# Patient Record
Sex: MALE | Race: WHITE | NOT HISPANIC OR LATINO | URBAN - METROPOLITAN AREA
[De-identification: names, ages, dates, MRNs, and addresses within clinical notes are randomized per-mention and may not be internally consistent; named-entity substitution may affect disease eponyms.]

---

## 2018-06-25 PROBLEM — Z00.00 ENCOUNTER FOR PREVENTIVE HEALTH EXAMINATION: Status: ACTIVE | Noted: 2018-06-25

## 2018-06-28 ENCOUNTER — OUTPATIENT (OUTPATIENT)
Dept: OUTPATIENT SERVICES | Facility: HOSPITAL | Age: 83
LOS: 1 days | End: 2018-06-28
Payer: MEDICARE

## 2018-06-28 DIAGNOSIS — M54.16 RADICULOPATHY, LUMBAR REGION: ICD-10-CM

## 2018-06-28 PROCEDURE — 62323 NJX INTERLAMINAR LMBR/SAC: CPT

## 2018-06-28 PROCEDURE — 77003 FLUOROGUIDE FOR SPINE INJECT: CPT

## 2018-07-20 ENCOUNTER — OUTPATIENT (OUTPATIENT)
Dept: OUTPATIENT SERVICES | Facility: HOSPITAL | Age: 83
LOS: 1 days | End: 2018-07-20
Payer: MEDICARE

## 2018-07-20 DIAGNOSIS — M54.16 RADICULOPATHY, LUMBAR REGION: ICD-10-CM

## 2018-07-20 PROCEDURE — 77003 FLUOROGUIDE FOR SPINE INJECT: CPT

## 2018-07-20 PROCEDURE — 62323 NJX INTERLAMINAR LMBR/SAC: CPT

## 2019-08-15 ENCOUNTER — OUTPATIENT (OUTPATIENT)
Dept: OUTPATIENT SERVICES | Facility: HOSPITAL | Age: 84
LOS: 1 days | End: 2019-08-15
Payer: MEDICARE

## 2019-08-15 ENCOUNTER — APPOINTMENT (OUTPATIENT)
Dept: RADIOLOGY | Facility: HOSPITAL | Age: 84
End: 2019-08-15

## 2019-08-15 DIAGNOSIS — M48.07 SPINAL STENOSIS, LUMBOSACRAL REGION: ICD-10-CM

## 2019-08-15 PROCEDURE — 62304 MYELOGRAPHY LUMBAR INJECTION: CPT

## 2019-08-15 PROCEDURE — 72132 CT LUMBAR SPINE W/DYE: CPT | Mod: 26

## 2019-08-15 PROCEDURE — 72132 CT LUMBAR SPINE W/DYE: CPT

## 2019-10-22 ENCOUNTER — OUTPATIENT (OUTPATIENT)
Dept: OUTPATIENT SERVICES | Facility: HOSPITAL | Age: 84
LOS: 1 days | End: 2019-10-22
Payer: MEDICARE

## 2019-10-22 DIAGNOSIS — M54.16 RADICULOPATHY, LUMBAR REGION: ICD-10-CM

## 2019-10-22 PROCEDURE — 62323 NJX INTERLAMINAR LMBR/SAC: CPT

## 2019-10-22 PROCEDURE — 77003 FLUOROGUIDE FOR SPINE INJECT: CPT

## 2020-01-16 ENCOUNTER — OUTPATIENT (OUTPATIENT)
Dept: OUTPATIENT SERVICES | Facility: HOSPITAL | Age: 85
LOS: 1 days | Discharge: HOME | End: 2020-01-16
Payer: MEDICARE

## 2020-01-16 DIAGNOSIS — M54.16 RADICULOPATHY, LUMBAR REGION: ICD-10-CM

## 2020-01-16 PROCEDURE — 77003 FLUOROGUIDE FOR SPINE INJECT: CPT

## 2020-01-16 PROCEDURE — 62323 NJX INTERLAMINAR LMBR/SAC: CPT

## 2020-07-14 ENCOUNTER — OUTPATIENT (OUTPATIENT)
Dept: OUTPATIENT SERVICES | Facility: HOSPITAL | Age: 85
LOS: 1 days | End: 2020-07-14
Payer: MEDICARE

## 2020-07-14 DIAGNOSIS — Z11.59 ENCOUNTER FOR SCREENING FOR OTHER VIRAL DISEASES: ICD-10-CM

## 2020-07-14 PROCEDURE — U0003: CPT

## 2020-07-15 LAB — SARS-COV-2 RNA SPEC QL NAA+PROBE: SIGNIFICANT CHANGE UP

## 2020-07-16 ENCOUNTER — OUTPATIENT (OUTPATIENT)
Dept: OUTPATIENT SERVICES | Facility: HOSPITAL | Age: 85
LOS: 1 days | End: 2020-07-16
Payer: MEDICARE

## 2020-07-16 DIAGNOSIS — M54.16 RADICULOPATHY, LUMBAR REGION: ICD-10-CM

## 2020-07-16 PROCEDURE — 77003 FLUOROGUIDE FOR SPINE INJECT: CPT

## 2020-07-16 PROCEDURE — 62323 NJX INTERLAMINAR LMBR/SAC: CPT

## 2021-02-04 ENCOUNTER — TRANSCRIPTION ENCOUNTER (OUTPATIENT)
Age: 86
End: 2021-02-04

## 2021-03-15 ENCOUNTER — TRANSCRIPTION ENCOUNTER (OUTPATIENT)
Age: 86
End: 2021-03-15

## 2021-04-25 ENCOUNTER — TRANSCRIPTION ENCOUNTER (OUTPATIENT)
Age: 86
End: 2021-04-25

## 2021-09-05 ENCOUNTER — TRANSCRIPTION ENCOUNTER (OUTPATIENT)
Age: 86
End: 2021-09-05

## 2022-01-02 ENCOUNTER — INPATIENT (INPATIENT)
Facility: HOSPITAL | Age: 87
LOS: 1 days | Discharge: HOME | DRG: 178 | End: 2022-01-04
Attending: INTERNAL MEDICINE | Admitting: STUDENT IN AN ORGANIZED HEALTH CARE EDUCATION/TRAINING PROGRAM
Payer: MEDICARE

## 2022-01-02 VITALS
DIASTOLIC BLOOD PRESSURE: 69 MMHG | RESPIRATION RATE: 22 BRPM | WEIGHT: 160.06 LBS | OXYGEN SATURATION: 96 % | HEART RATE: 70 BPM | SYSTOLIC BLOOD PRESSURE: 120 MMHG | TEMPERATURE: 98 F | HEIGHT: 62 IN

## 2022-01-02 DIAGNOSIS — Z29.9 ENCOUNTER FOR PROPHYLACTIC MEASURES, UNSPECIFIED: ICD-10-CM

## 2022-01-02 DIAGNOSIS — J18.9 PNEUMONIA, UNSPECIFIED ORGANISM: ICD-10-CM

## 2022-01-02 DIAGNOSIS — Z98.890 OTHER SPECIFIED POSTPROCEDURAL STATES: Chronic | ICD-10-CM

## 2022-01-02 DIAGNOSIS — D64.9 ANEMIA, UNSPECIFIED: ICD-10-CM

## 2022-01-02 DIAGNOSIS — J44.9 CHRONIC OBSTRUCTIVE PULMONARY DISEASE, UNSPECIFIED: ICD-10-CM

## 2022-01-02 DIAGNOSIS — U07.1 COVID-19: ICD-10-CM

## 2022-01-02 DIAGNOSIS — J44.1 CHRONIC OBSTRUCTIVE PULMONARY DISEASE WITH (ACUTE) EXACERBATION: ICD-10-CM

## 2022-01-02 DIAGNOSIS — I50.9 HEART FAILURE, UNSPECIFIED: ICD-10-CM

## 2022-01-02 DIAGNOSIS — I48.91 UNSPECIFIED ATRIAL FIBRILLATION: ICD-10-CM

## 2022-01-02 LAB
ALBUMIN SERPL ELPH-MCNC: 2.4 G/DL — LOW (ref 3.3–5)
ALP SERPL-CCNC: 88 U/L — SIGNIFICANT CHANGE UP (ref 40–120)
ALT FLD-CCNC: 41 U/L — SIGNIFICANT CHANGE UP (ref 12–78)
ANION GAP SERPL CALC-SCNC: 6 MMOL/L — SIGNIFICANT CHANGE UP (ref 5–17)
APPEARANCE UR: ABNORMAL
APTT BLD: 26.9 SEC — LOW (ref 27.5–35.5)
AST SERPL-CCNC: 20 U/L — SIGNIFICANT CHANGE UP (ref 15–37)
BACTERIA # UR AUTO: ABNORMAL
BASE EXCESS BLDA CALC-SCNC: 3.3 MMOL/L — HIGH (ref -2–3)
BASOPHILS # BLD AUTO: 0.01 K/UL — SIGNIFICANT CHANGE UP (ref 0–0.2)
BASOPHILS NFR BLD AUTO: 0.1 % — SIGNIFICANT CHANGE UP (ref 0–2)
BILIRUB SERPL-MCNC: 0.3 MG/DL — SIGNIFICANT CHANGE UP (ref 0.2–1.2)
BILIRUB UR-MCNC: NEGATIVE — SIGNIFICANT CHANGE UP
BLOOD GAS COMMENTS ARTERIAL: SIGNIFICANT CHANGE UP
BLOOD GAS COMMENTS ARTERIAL: SIGNIFICANT CHANGE UP
BUN SERPL-MCNC: 29 MG/DL — HIGH (ref 7–23)
CALCIUM SERPL-MCNC: 9.3 MG/DL — SIGNIFICANT CHANGE UP (ref 8.5–10.1)
CHLORIDE SERPL-SCNC: 108 MMOL/L — SIGNIFICANT CHANGE UP (ref 96–108)
CK SERPL-CCNC: 16 U/L — LOW (ref 26–308)
CO2 SERPL-SCNC: 27 MMOL/L — SIGNIFICANT CHANGE UP (ref 22–31)
COLOR SPEC: YELLOW — SIGNIFICANT CHANGE UP
CREAT SERPL-MCNC: 0.85 MG/DL — SIGNIFICANT CHANGE UP (ref 0.5–1.3)
DIFF PNL FLD: NEGATIVE — SIGNIFICANT CHANGE UP
EOSINOPHIL # BLD AUTO: 0.01 K/UL — SIGNIFICANT CHANGE UP (ref 0–0.5)
EOSINOPHIL NFR BLD AUTO: 0.1 % — SIGNIFICANT CHANGE UP (ref 0–6)
EPI CELLS # UR: ABNORMAL
FLUAV AG NPH QL: SIGNIFICANT CHANGE UP
FLUBV AG NPH QL: SIGNIFICANT CHANGE UP
GLUCOSE SERPL-MCNC: 206 MG/DL — HIGH (ref 70–99)
GLUCOSE UR QL: NEGATIVE — SIGNIFICANT CHANGE UP
HCO3 BLDA-SCNC: 28 MMOL/L — SIGNIFICANT CHANGE UP (ref 21–28)
HCT VFR BLD CALC: 39 % — SIGNIFICANT CHANGE UP (ref 39–50)
HGB BLD-MCNC: 12.2 G/DL — LOW (ref 13–17)
IMM GRANULOCYTES NFR BLD AUTO: 1.1 % — SIGNIFICANT CHANGE UP (ref 0–1.5)
INR BLD: 0.94 RATIO — SIGNIFICANT CHANGE UP (ref 0.88–1.16)
KETONES UR-MCNC: NEGATIVE — SIGNIFICANT CHANGE UP
LACTATE SERPL-SCNC: 1.8 MMOL/L — SIGNIFICANT CHANGE UP (ref 0.7–2)
LEUKOCYTE ESTERASE UR-ACNC: ABNORMAL
LYMPHOCYTES # BLD AUTO: 0.55 K/UL — LOW (ref 1–3.3)
LYMPHOCYTES # BLD AUTO: 4.1 % — LOW (ref 13–44)
MCHC RBC-ENTMCNC: 27.7 PG — SIGNIFICANT CHANGE UP (ref 27–34)
MCHC RBC-ENTMCNC: 31.3 GM/DL — LOW (ref 32–36)
MCV RBC AUTO: 88.4 FL — SIGNIFICANT CHANGE UP (ref 80–100)
MONOCYTES # BLD AUTO: 0.2 K/UL — SIGNIFICANT CHANGE UP (ref 0–0.9)
MONOCYTES NFR BLD AUTO: 1.5 % — LOW (ref 2–14)
NEUTROPHILS # BLD AUTO: 12.61 K/UL — HIGH (ref 1.8–7.4)
NEUTROPHILS NFR BLD AUTO: 93.1 % — HIGH (ref 43–77)
NITRITE UR-MCNC: NEGATIVE — SIGNIFICANT CHANGE UP
NRBC # BLD: 0 /100 WBCS — SIGNIFICANT CHANGE UP (ref 0–0)
NT-PROBNP SERPL-SCNC: 1087 PG/ML — HIGH (ref 0–450)
NT-PROBNP SERPL-SCNC: 971 PG/ML — HIGH (ref 0–450)
PCO2 BLDA: 48 MMHG — SIGNIFICANT CHANGE UP (ref 35–48)
PH BLDA: 7.38 — SIGNIFICANT CHANGE UP (ref 7.35–7.45)
PH UR: 5 — SIGNIFICANT CHANGE UP (ref 5–8)
PLATELET # BLD AUTO: 190 K/UL — SIGNIFICANT CHANGE UP (ref 150–400)
PO2 BLDA: 119 MMHG — HIGH (ref 83–108)
POTASSIUM SERPL-MCNC: 4.7 MMOL/L — SIGNIFICANT CHANGE UP (ref 3.5–5.3)
POTASSIUM SERPL-SCNC: 4.7 MMOL/L — SIGNIFICANT CHANGE UP (ref 3.5–5.3)
PROCALCITONIN SERPL-MCNC: 0.14 NG/ML — HIGH (ref 0–0.04)
PROT SERPL-MCNC: 5.7 G/DL — LOW (ref 6–8.3)
PROT UR-MCNC: 30 MG/DL
PROTHROM AB SERPL-ACNC: 11 SEC — SIGNIFICANT CHANGE UP (ref 10.6–13.6)
RBC # BLD: 4.41 M/UL — SIGNIFICANT CHANGE UP (ref 4.2–5.8)
RBC # FLD: 16.8 % — HIGH (ref 10.3–14.5)
RBC CASTS # UR COMP ASSIST: ABNORMAL /HPF (ref 0–4)
RSV RNA NPH QL NAA+NON-PROBE: SIGNIFICANT CHANGE UP
SAO2 % BLDA: 100 % — HIGH (ref 94–98)
SARS-COV-2 RNA SPEC QL NAA+PROBE: DETECTED
SODIUM SERPL-SCNC: 141 MMOL/L — SIGNIFICANT CHANGE UP (ref 135–145)
SP GR SPEC: 1.02 — SIGNIFICANT CHANGE UP (ref 1.01–1.02)
TROPONIN I, HIGH SENSITIVITY RESULT: 15.9 NG/L — SIGNIFICANT CHANGE UP
UROBILINOGEN FLD QL: NEGATIVE — SIGNIFICANT CHANGE UP
WBC # BLD: 13.53 K/UL — HIGH (ref 3.8–10.5)
WBC # FLD AUTO: 13.53 K/UL — HIGH (ref 3.8–10.5)
WBC UR QL: ABNORMAL

## 2022-01-02 PROCEDURE — 99223 1ST HOSP IP/OBS HIGH 75: CPT | Mod: GC

## 2022-01-02 PROCEDURE — 71045 X-RAY EXAM CHEST 1 VIEW: CPT | Mod: 26

## 2022-01-02 PROCEDURE — 93010 ELECTROCARDIOGRAM REPORT: CPT

## 2022-01-02 PROCEDURE — 99285 EMERGENCY DEPT VISIT HI MDM: CPT | Mod: CS

## 2022-01-02 RX ORDER — ONDANSETRON 8 MG/1
4 TABLET, FILM COATED ORAL ONCE
Refills: 0 | Status: COMPLETED | OUTPATIENT
Start: 2022-01-02 | End: 2022-01-02

## 2022-01-02 RX ORDER — DONEPEZIL HYDROCHLORIDE 10 MG/1
5 TABLET, FILM COATED ORAL AT BEDTIME
Refills: 0 | Status: DISCONTINUED | OUTPATIENT
Start: 2022-01-02 | End: 2022-01-04

## 2022-01-02 RX ORDER — PIPERACILLIN AND TAZOBACTAM 4; .5 G/20ML; G/20ML
3.38 INJECTION, POWDER, LYOPHILIZED, FOR SOLUTION INTRAVENOUS ONCE
Refills: 0 | Status: COMPLETED | OUTPATIENT
Start: 2022-01-02 | End: 2022-01-02

## 2022-01-02 RX ORDER — TAMSULOSIN HYDROCHLORIDE 0.4 MG/1
0.4 CAPSULE ORAL AT BEDTIME
Refills: 0 | Status: DISCONTINUED | OUTPATIENT
Start: 2022-01-02 | End: 2022-01-04

## 2022-01-02 RX ORDER — BUDESONIDE AND FORMOTEROL FUMARATE DIHYDRATE 160; 4.5 UG/1; UG/1
2 AEROSOL RESPIRATORY (INHALATION)
Refills: 0 | Status: DISCONTINUED | OUTPATIENT
Start: 2022-01-02 | End: 2022-01-04

## 2022-01-02 RX ORDER — SODIUM CHLORIDE 9 MG/ML
1000 INJECTION INTRAMUSCULAR; INTRAVENOUS; SUBCUTANEOUS ONCE
Refills: 0 | Status: COMPLETED | OUTPATIENT
Start: 2022-01-02 | End: 2022-01-02

## 2022-01-02 RX ORDER — LATANOPROST 0.05 MG/ML
1 SOLUTION/ DROPS OPHTHALMIC; TOPICAL AT BEDTIME
Refills: 0 | Status: DISCONTINUED | OUTPATIENT
Start: 2022-01-02 | End: 2022-01-04

## 2022-01-02 RX ORDER — TRAMADOL HYDROCHLORIDE 50 MG/1
50 TABLET ORAL EVERY 8 HOURS
Refills: 0 | Status: DISCONTINUED | OUTPATIENT
Start: 2022-01-02 | End: 2022-01-04

## 2022-01-02 RX ORDER — AMLODIPINE BESYLATE 2.5 MG/1
5 TABLET ORAL DAILY
Refills: 0 | Status: DISCONTINUED | OUTPATIENT
Start: 2022-01-02 | End: 2022-01-04

## 2022-01-02 RX ORDER — ALBUTEROL 90 UG/1
2 AEROSOL, METERED ORAL EVERY 6 HOURS
Refills: 0 | Status: DISCONTINUED | OUTPATIENT
Start: 2022-01-02 | End: 2022-01-04

## 2022-01-02 RX ORDER — TIOTROPIUM BROMIDE 18 UG/1
1 CAPSULE ORAL; RESPIRATORY (INHALATION) DAILY
Refills: 0 | Status: DISCONTINUED | OUTPATIENT
Start: 2022-01-02 | End: 2022-01-04

## 2022-01-02 RX ORDER — PIPERACILLIN AND TAZOBACTAM 4; .5 G/20ML; G/20ML
3.38 INJECTION, POWDER, LYOPHILIZED, FOR SOLUTION INTRAVENOUS EVERY 8 HOURS
Refills: 0 | Status: DISCONTINUED | OUTPATIENT
Start: 2022-01-02 | End: 2022-01-03

## 2022-01-02 RX ORDER — CARVEDILOL PHOSPHATE 80 MG/1
6.25 CAPSULE, EXTENDED RELEASE ORAL EVERY 12 HOURS
Refills: 0 | Status: DISCONTINUED | OUTPATIENT
Start: 2022-01-02 | End: 2022-01-04

## 2022-01-02 RX ORDER — ALBUTEROL 90 UG/1
2 AEROSOL, METERED ORAL ONCE
Refills: 0 | Status: COMPLETED | OUTPATIENT
Start: 2022-01-02 | End: 2022-01-02

## 2022-01-02 RX ADMIN — PIPERACILLIN AND TAZOBACTAM 200 GRAM(S): 4; .5 INJECTION, POWDER, LYOPHILIZED, FOR SOLUTION INTRAVENOUS at 17:09

## 2022-01-02 RX ADMIN — SODIUM CHLORIDE 1000 MILLILITER(S): 9 INJECTION INTRAMUSCULAR; INTRAVENOUS; SUBCUTANEOUS at 15:20

## 2022-01-02 RX ADMIN — LATANOPROST 1 DROP(S): 0.05 SOLUTION/ DROPS OPHTHALMIC; TOPICAL at 23:53

## 2022-01-02 RX ADMIN — ALBUTEROL 2 PUFF(S): 90 AEROSOL, METERED ORAL at 18:26

## 2022-01-02 RX ADMIN — DONEPEZIL HYDROCHLORIDE 5 MILLIGRAM(S): 10 TABLET, FILM COATED ORAL at 23:53

## 2022-01-02 RX ADMIN — TAMSULOSIN HYDROCHLORIDE 0.4 MILLIGRAM(S): 0.4 CAPSULE ORAL at 23:53

## 2022-01-02 RX ADMIN — Medication 200 MILLIGRAM(S): at 23:53

## 2022-01-02 RX ADMIN — Medication 125 MILLIGRAM(S): at 18:25

## 2022-01-02 RX ADMIN — PIPERACILLIN AND TAZOBACTAM 3.38 GRAM(S): 4; .5 INJECTION, POWDER, LYOPHILIZED, FOR SOLUTION INTRAVENOUS at 18:13

## 2022-01-02 RX ADMIN — SODIUM CHLORIDE 1000 MILLILITER(S): 9 INJECTION INTRAMUSCULAR; INTRAVENOUS; SUBCUTANEOUS at 18:13

## 2022-01-02 RX ADMIN — ONDANSETRON 4 MILLIGRAM(S): 8 TABLET, FILM COATED ORAL at 19:39

## 2022-01-02 RX ADMIN — TRAMADOL HYDROCHLORIDE 50 MILLIGRAM(S): 50 TABLET ORAL at 23:53

## 2022-01-02 NOTE — H&P ADULT - ASSESSMENT
89 y/o M w/ PMHx COPD(on O2 3-4L), CHF, Afib(on Eliquis), PPM/defibrillator, BIBEMS for shortness of breath around 12PM today. History as per son Crow as pt is a poor historian and is confused. Pt was satting around 95 O2, but was "huffing and puffing" prior to EMS arriving today. Admitted for Covid-19 infection with superimposed PNA, ?aspiration.

## 2022-01-02 NOTE — H&P ADULT - NSHPPHYSICALEXAM_GEN_ALL_CORE
T(C): 36.5 (01-02-22 @ 19:54), Max: 36.5 (01-02-22 @ 19:54)  HR: 62 (01-02-22 @ 19:54) (62 - 70)  BP: 138/75 (01-02-22 @ 19:54) (120/69 - 138/75)  RR: 22 (01-02-22 @ 19:54) (22 - 22)  SpO2: 98% (01-02-22 @ 19:54) (96% - 98%)    GENERAL: patient appears confused with empty cup in mouth  EYES: sclera clear  ENMT: oropharynx clear without erythema, moist mucous membranes  LUNGS: diffuse rhonchi and wheezing appreciated bilaterally  HEART: soft S1/S2, regular rate and rhythm, no murmurs noted, +1 ptting edema to lower extremity to level of ankles b/l  GASTROINTESTINAL: abdomen is soft, nontender  INTEGUMENT: good skin turgor, warm skin  MUSCULOSKELETAL: no clubbing or cyanosis  NEUROLOGIC: awake, alert, oriented x1(unable to state year and location), forgetful  HEME/LYMPH: no obvious ecchymosis or petechiae T(C): 36.5 (01-02-22 @ 19:54), Max: 36.5 (01-02-22 @ 19:54)  HR: 62 (01-02-22 @ 19:54) (62 - 70)  BP: 138/75 (01-02-22 @ 19:54) (120/69 - 138/75)  RR: 22 (01-02-22 @ 19:54) (22 - 22)  SpO2: 98% (01-02-22 @ 19:54) (96% - 98%)    GENERAL: patient appears confused with empty cup in mouth  EYES: sclera clear  ENMT: oropharynx clear without erythema, moist mucous membranes  LUNGS: diffuse rhonchi and wheezing appreciated bilaterally  HEART: soft S1/S2, regular rate and rhythm, no murmurs noted, +1 pitting edema to lower extremity to level of ankles b/l  GASTROINTESTINAL: abdomen is soft, nontender  INTEGUMENT: good skin turgor, warm skin  MUSCULOSKELETAL: no clubbing or cyanosis  NEUROLOGIC: awake, alert, oriented x1(unable to state year and location), forgetful  HEME/LYMPH: no obvious ecchymosis or petechiae

## 2022-01-02 NOTE — H&P ADULT - PROBLEM SELECTOR PLAN 1
pt with reported chronic cough, O2 desaturation, leukocytosis, and ?RLL opacity vs effusion, concern for superimposed PNA and covid-19 infection  - Upon arrival to ER, pt was placed on NRB, now at baseline O2 needs, ~3-4L at home  - continuous pulse ox, supplemental O2 as needed  - s/p IV zosyn x 1 on admission--will start IV Rocephin/Zithro   - check procal, strep pneumo, urine legionella  - blood and urine cx ordered, f/u results pt with reported chronic cough, O2 desaturation, leukocytosis, and ?RLL opacity vs effusion, concern for superimposed PNA and covid-19 infection  - Upon arrival to ER, pt was placed on NRB, now at baseline O2 needs, ~3-4L at home  - continuous pulse ox, supplemental O2 as needed  - s/p IV zosyn x 1 on admission--continue in setting of r/o asp pna  - check CT chest  - check procal, strep pneumo, urine legionella  - blood and urine cx ordered, f/u results pt with reported chronic cough, O2 desaturation, leukocytosis, and ?RLL opacity vs effusion, concern for superimposed PNA and covid-19 infection  - Upon arrival to ER, pt was placed on NRB, now at baseline O2 needs, ~3-4L at home  - continuous pulse ox, supplemental O2 as needed  - s/p IV zosyn x 1 on admission--continue in setting of r/o asp vs hospital acquired pna  - check CT chest to further evaluate infiltrate   - check procal, strep pneumo, urine legionella  - blood and urine cx ordered, f/u results  - primary team to obtain [facility] records to assess if infiltrate is in fact new   - continue home prednisone taper at this time.

## 2022-01-02 NOTE — ED PROVIDER NOTE - PROGRESS NOTE DETAILS
[facility] mult times - wants xfer to [facility]. Dw Xfer center - trung [facility] at [place] (hospitalist) , pt accepted to [facility] - Dr Masterson. Pt will likely not have be until tomorrow. [facility] center - pt Now NOT accepted due to pt surge per their medical director. Will need to admit at [place]. Trung pts son, agree with plan. [facility], will admit.

## 2022-01-02 NOTE — ED PROVIDER NOTE - ENMT, MLM
Airway patent, Nasal mucosa clear. Mouth with normal mucosa. Throat has no vesicles, no oropharyngeal exudates and uvula is midline. MM moist. neck suple. no meningeal signs.

## 2022-01-02 NOTE — H&P ADULT - NSICDXPASTMEDICALHX_GEN_ALL_CORE_FT
PAST MEDICAL HISTORY:  Atrial fibrillation on eliquis, s/p ppm    CHF (congestive heart failure)     COPD (chronic obstructive pulmonary disease) on 3-4L O2

## 2022-01-02 NOTE — ED PROVIDER NOTE - SECONDARY DIAGNOSIS.
Pneumonia of right lung due to infectious organism, unspecified part of lung 2019 novel coronavirus disease (COVID-19)

## 2022-01-02 NOTE — H&P ADULT - ATTENDING COMMENTS
I personally conducted a physical examination of the patient. I personally gathered the patient's history. I edited the above listed findings which were prepared by the listed resident physician. I personally discussed the plan of care with the patient. The questions and concerns were addressed to the best of my ability. The patient is in agreement with the listed treatment plan.    Unclear if current symptoms are 2/2 bacterial pneumonia or COVID 19 as it is unknown if infiltrate is from previous pneumonia treated at [facility], continue abx for now. FU ct chest, if CT chest more consistent with COVID 19 start decadron and dc prednisone taper. Primary team to obtain [facility] records in am and compare imaging.

## 2022-01-02 NOTE — ED ADULT NURSE NOTE - NSIMPLEMENTINTERV_GEN_ALL_ED
Implemented All Fall with Harm Risk Interventions:  [place] to call system. Call bell, personal items and telephone within reach. Instruct patient to call for assistance. Room bathroom lighting operational. Non-slip footwear when patient is off stretcher. Physically safe environment: no spills, clutter or unnecessary equipment. Stretcher in lowest position, wheels locked, appropriate side rails in place. Provide visual cue, wrist band, yellow gown, etc. Monitor gait and stability. Monitor for mental status changes and reorient to person, place, and time. Review medications for side effects contributing to fall risk. Reinforce activity limits and safety measures with patient and family. Provide visual clues: red socks.

## 2022-01-02 NOTE — CONSULT NOTE ADULT - ASSESSMENT
87 y/o M w/ PMHx COPD(on O2 3-4L), CHF, Afib(on Eliquis), PPM/defibrillator, BIBEMS for shortness of breath around 12PM today. History as per son, Crow as pt is a poor historian and is confused. Pt was satting around 95 O2, but was "huffing and puffing" prior to EMS arriving today.    DOUBT Bacterial Pna - would consider holding ABX - will check [facility] and CT chest imaging -     [facility]vs rx regimen and bp control  on diuresis  I and O  monitor labs - replete lytes  AF - on Eliquis -   serial D dimer -   COPD - o2 support - Proventil PRN - Spiriva - Symbicort -   Covid with Hypoxemia - Decadron PO daily - 6 mg  monitor VS and HD and Sat  assist with needs  ACAP and robitussin PRN  isolation precs  Full Code

## 2022-01-02 NOTE — ED ADULT NURSE REASSESSMENT NOTE - NSIMPLEMENTINTERV_GEN_ALL_ED
Implemented All Fall Risk Interventions:  [place] to call system. Call bell, personal items and telephone within reach. Instruct patient to call for assistance. Room bathroom lighting operational. Non-slip footwear when patient is off stretcher. Physically safe environment: no spills, clutter or unnecessary equipment. Stretcher in lowest position, wheels locked, appropriate side rails in place. Provide visual cue, wrist band, yellow gown, etc. Monitor gait and stability. Monitor for mental status changes and reorient to person, place, and time. Review medications for side effects contributing to fall risk. Reinforce activity limits and safety measures with patient and family.

## 2022-01-02 NOTE — H&P ADULT - PROBLEM SELECTOR PLAN 6
Hb 12.2 on admission, no prior in chart  - MCV normocytic  - Likely chronic   - no signs of active bleed  - check iron studies

## 2022-01-02 NOTE — H&P ADULT - PROBLEM SELECTOR PLAN 5
Chronic, rate controlled  - EKG v-paced  - Takes Eliquis 5mg bid, but admittedly hasn't been compliant with it as he ran out of refills, will continue here  - Carvedilol 6.25mg bid, continue with hold parameters

## 2022-01-02 NOTE — H&P ADULT - PROBLEM SELECTOR PLAN 4
Chronic, stable  - proBNP slightly elevated on admission, though pt does not entirely appear to be volume OL  - Takes Torsemide 20mg daily, continue with hold parameters Chronic, stable  - proBNP slightly elevated on admission, though pt does not appear to be volume OL  - Takes Torsemide 20mg daily, continue with hold parameters  - close monitoring of volume status

## 2022-01-02 NOTE — H&P ADULT - NSHPSOCIALHISTORY_GEN_ALL_CORE
Smoked 2ppd ~50+ years  Social ETOH  Denies drug use  Uses cane to get around  Has an aide that comes in 4hrs/day  Needs help bathing, getting dressed Smoked 2ppd ~50+ years  Social ETOH  Denies drug use  Uses cane to get around  Lives with son, Has an aide that comes in 4hrs/day  Needs help bathing, getting dressed

## 2022-01-02 NOTE — H&P ADULT - HISTORY OF PRESENT ILLNESS
89 y/o M w/ PMHx COPD(on O2 3-4L), CHF, Afib(on Eliquis), PPM/defibrillator, BIBEMS for shortness of breath around 12PM today. History as per son, Crow as pt is a poor historian and is confused. Pt was satting around 95 O2, but was "huffing and puffing" prior to EMS arriving today. Son states his confusion started when he left [facility], which he describes as "not making any sense" or "talking gibberish". States pt is a long-time smoker and is always congested and is always coughing with sputum production. Denies any sick contacts or recent travel. Denies receiving any covid vaccinations.     Of note, pt with Recent hospitalization from 12/22-12/25 at [facility] for PNA, was given abx, prednisone, and had been dc'd on prednisone taper.     ED Course: s/p IV zosyn x 1, 1L ns bolus x 1, albuterol x1, Solu-medrol 125mg IVP x 1, Zofran 4mg IVP x 1  Vitals: T 97.6, HR 70, /69, O2 96 on NRB  Labs: wbc 13.53, Hb 12.2, BUN 29, Glucose 206, Tprotein 5.7, Alb 2.4, CK 16, ProBNP 971, pO2 119, SARS-COV-2 positive  UA: Slightly turbid, Moderate LE, wbc 11-25, rbc 3-5, Moderate bacteria, Moderate epithelial cells  87 y/o M w/ PMHx COPD(on O2 3-4L), CHF, Afib(on Eliquis), PPM/defibrillator, BIBEMS for shortness of breath around 12PM today. History as per son, Crow as pt is a poor historian and is confused. Pt was sitting around watching tv, when he started "huffing and puffing", but was satting around 95 O2. States pt is a long-time smoker and is always congested and is always coughing with sputum production. Denies any sick contacts or recent travel. Denies receiving any covid vaccinations. Son states his confusion started when he left [facility], which he describes as "not making any sense" or "talking gibberish". At baseline, pt uses a cane to ambulate and since ~1 month ago, pt has been unable to perform most ADLs, (i.e. needs assistance with bathing and dressing). Also has a aide at home that comes ~4hrs/day.     Of note, pt with recent hospitalization at [facility] from 12/22-12/25 for PNA, was given abx and prednisone, and had been dc'd on prednisone taper.     ED Course: s/p IV zosyn x 1, 1L ns bolus x 1, albuterol x1, Solu-medrol 125mg IVP x 1, Zofran 4mg IVP x 1  Vitals: T 97.6, HR 70, /69, O2 96 on NRB  Labs: wbc 13.53, Hb 12.2, BUN 29, Glucose 206, Tprotein 5.7, Alb 2.4, CK 16, ProBNP 971, pO2 119, SARS-COV-2 positive  UA: Slightly turbid, Moderate LE, wbc 11-25, rbc 3-5, Moderate bacteria, Moderate epithelial cells   CXR(wet read): ?RLL effusion vs opacity, f/u official report  EKG: Ventricular paced rhythm, QT/QTc 440/450

## 2022-01-02 NOTE — H&P ADULT - PROBLEM SELECTOR PLAN 7
DVT PPX: Eliquis 5mg BID DVT PPX: Eliquis 5mg BID  GOC: Discussed with daughter, Imelda, who states that she would like patient to be FULL Code with trials of intubation if needed

## 2022-01-02 NOTE — H&P ADULT - PROBLEM SELECTOR PLAN 2
pt currently unvaccinated, found to have positive Covid-19 infection  - unclear when symptoms had started as son states pt has hx of chronic cough and had tested negative multiple times during last hospitalization prior to 12/25/21  - Can start PO Dexamethasone x 10 days pt currently unvaccinated, found to have positive Covid-19 infection  - unclear when symptoms had started as son states pt has hx of chronic cough and had tested negative multiple times during last hospitalization prior to 12/25/21  - as pt is at baseline O2 needs, will hold on dexamethasone use for now

## 2022-01-02 NOTE — ED ADULT NURSE REASSESSMENT NOTE - NS ED NURSE REASSESS COMMENT FT1
received report from MARIN Muro.  VSS afebrile.  pt had an episode of post tussive emesis undigested food.  zofran given x1.  pt soiled in urine,  able to stand at bedside with one person assist to use urinal.  ADL's met by staff.  call bell within reach.  bed in lowest position.  will closely monitor.

## 2022-01-02 NOTE — ED PROVIDER NOTE - CARE PLAN
1 Principal Discharge DX:	COPD exacerbation  Secondary Diagnosis:	Pneumonia of right lung due to infectious organism, unspecified part of lung  Secondary Diagnosis:	2019 novel coronavirus disease (COVID-19)

## 2022-01-02 NOTE — CONSULT NOTE ADULT - SUBJECTIVE AND OBJECTIVE BOX
Date/Time Patient Seen:  		  Referring MD:   Data Reviewed	       Patient is a 88y old  Male who presents with a chief complaint of Covid-19 and PNA (02 Jan 2022 19:16)      Subjective/HPI  poor historian  on isolation for covid  follows with Pulm outpatient  vs noted  labs reviewed  ABG noted  on o2 support    H and P reviewed  ER provider note reviewed       History and Physical:   Source of Information	Chart(s), Child  Outpatient Providers	PCP- Dr. Hipolito Rinaldi([place])  Pulm- Dr. Dewey Valentino  Cardio- Dr. Vannessa Juarez     Language:  · Patient/Family of Limited English Proficiency	No       Patient Identity:  · Birth Sex	Male     History of Present Illness:  Reason for Admission: Covid-19 and PNA  History of Present Illness:   87 y/o M w/ PMHx COPD(on O2 3-4L), CHF, Afib(on Eliquis), PPM/defibrillator, BIBEMS for shortness of breath around 12PM today. History as per son, Crow as pt is a poor historian and is confused. Pt was sitting around watching tv, when he started "huffing and puffing", but was satting around 95 O2. States pt is a long-time smoker and is always congested and is always coughing with sputum production. Denies any sick contacts or recent travel. Denies receiving any covid vaccinations. Son states his confusion started when he left [facility], which he describes as "not making any sense" or "talking gibberish". At baseline, pt uses a cane to ambulate and since ~1 month ago, pt has been unable to perform most ADLs, (i.e. needs assistance with bathing and dressing). Also has a aide at home that comes ~4hrs/day.     Of note, pt with recent hospitalization at [facility] from 12/22-12/25 for PNA, was given abx and prednisone, and had been dc'd on prednisone taper.   PAST MEDICAL & SURGICAL HISTORY:  COPD (chronic obstructive pulmonary disease)  on 3-4L O2    CHF (congestive heart failure)    Atrial fibrillation  on eliquis, s/p ppm    H/O knee surgery     Social History:  Social History (marital status, living situation, occupation, tobacco use, alcohol and drug use, and sexual history): Smoked 2ppd ~50+ years  Social ETOH  Denies drug use  Uses cane to get around  Lives with son, Has an aide that comes in 4hrs/day  Needs help bathing, getting dressed     Tobacco Screening:  · Core Measure Site	Yes  · Has the patient used tobacco in the past 30 days?	No    Risk Assessment:    Present on Admission:  Deep Venous Thrombosis	no  Pulmonary Embolus	no     Heart Failure:  Does this patient have a history of or has been diagnosed with heart failure? yes.     LV Function Assessment (LVS function was evaluated before arrival and/or during hospitalization) unknown.      Medication list         MEDICATIONS  (STANDING):  amLODIPine   Tablet 5 milliGRAM(s) Oral daily  budesonide 160 MICROgram(s)/formoterol 4.5 MICROgram(s) Inhaler 2 Puff(s) Inhalation two times a day  carvedilol 6.25 milliGRAM(s) Oral every 12 hours  donepezil 5 milliGRAM(s) Oral at bedtime  latanoprost 0.005% Ophthalmic Solution 1 Drop(s) Both EYES at bedtime  multivitamin 1 Tablet(s) Oral daily  piperacillin/tazobactam IVPB.. 3.375 Gram(s) IV Intermittent every 8 hours  tamsulosin 0.4 milliGRAM(s) Oral at bedtime  torsemide 20 milliGRAM(s) Oral daily    MEDICATIONS  (PRN):  ALBUTerol    90 MICROgram(s) HFA Inhaler 2 Puff(s) Inhalation every 6 hours PRN Shortness of Breath and/or Wheezing  traMADol 50 milliGRAM(s) Oral every 8 hours PRN Severe Pain (7 - 10)         Vitals log        ICU Vital Signs Last 24 Hrs  T(C): 36.5 (02 Jan 2022 19:54), Max: 36.5 (02 Jan 2022 19:54)  T(F): 97.7 (02 Jan 2022 19:54), Max: 97.7 (02 Jan 2022 19:54)  HR: 62 (02 Jan 2022 19:54) (62 - 70)  BP: 138/75 (02 Jan 2022 19:54) (120/69 - 138/75)  BP(mean): --  ABP: --  ABP(mean): --  RR: 22 (02 Jan 2022 19:54) (22 - 22)  SpO2: 98% (02 Jan 2022 19:54) (96% - 98%)           Input and Output:  I&O's Detail      Lab Data                        12.2   13.53 )-----------( 190      ( 02 Jan 2022 15:07 )             39.0     01-02    141  |  108  |  29<H>  ----------------------------<  206<H>  4.7   |  27  |  0.85    Ca    9.3      02 Jan 2022 15:07    TPro  5.7<L>  /  Alb  2.4<L>  /  TBili  0.3  /  DBili  x   /  AST  20  /  ALT  41  /  AlkPhos  88  01-02    ABG - ( 02 Jan 2022 15:10 )  pH, Arterial: 7.38  pH, Blood: x     /  pCO2: 48    /  pO2: 119   / HCO3: 28    / Base Excess: 3.3   /  SaO2: 100.0             CARDIAC MARKERS ( 02 Jan 2022 15:07 )  x     / x     / 16 U/L / x     / x            Review of Systems	  sob  javier  weakness  poor historian  on o2 support      Objective     Physical Examination  verbal  awake  lung dec BS  no wheeze  head nc  heart s1s2  cn grossly int        Pertinent Lab findings & Imaging      Katia:  NO   Adequate UO     I&O's Detail           Discussed with:     Cultures:	        Radiology

## 2022-01-02 NOTE — ED PROVIDER NOTE - OBJECTIVE STATEMENT
89 yo M w/ hx COPD, PPM / AICD, CHF, p/w BIB EMS for SOB. Pt with hx COPD - on chronic O2, reportedly had "issue" with home O2, aide came to see pt and called EMS. No acute pain. Pt poor historian, unable to get hx.  Dw son - pt with some dyspnea today, no known covid exposures, pt NOT vaccinated, hx covid last yr. Wants pt xferred to [place]  Son Crow Sunitha [phone]

## 2022-01-03 LAB
ALBUMIN SERPL ELPH-MCNC: 2.7 G/DL — LOW (ref 3.3–5)
ALP SERPL-CCNC: 76 U/L — SIGNIFICANT CHANGE UP (ref 40–120)
ALT FLD-CCNC: 40 U/L — SIGNIFICANT CHANGE UP (ref 12–78)
ANION GAP SERPL CALC-SCNC: 5 MMOL/L — SIGNIFICANT CHANGE UP (ref 5–17)
AST SERPL-CCNC: 15 U/L — SIGNIFICANT CHANGE UP (ref 15–37)
BASOPHILS # BLD AUTO: 0.02 K/UL — SIGNIFICANT CHANGE UP (ref 0–0.2)
BASOPHILS NFR BLD AUTO: 0.2 % — SIGNIFICANT CHANGE UP (ref 0–2)
BILIRUB SERPL-MCNC: 0.4 MG/DL — SIGNIFICANT CHANGE UP (ref 0.2–1.2)
BUN SERPL-MCNC: 26 MG/DL — HIGH (ref 7–23)
CALCIUM SERPL-MCNC: 9.1 MG/DL — SIGNIFICANT CHANGE UP (ref 8.5–10.1)
CHLORIDE SERPL-SCNC: 101 MMOL/L — SIGNIFICANT CHANGE UP (ref 96–108)
CO2 SERPL-SCNC: 32 MMOL/L — HIGH (ref 22–31)
CREAT SERPL-MCNC: 1 MG/DL — SIGNIFICANT CHANGE UP (ref 0.5–1.3)
D DIMER BLD IA.RAPID-MCNC: 549 NG/ML DDU — HIGH
EOSINOPHIL # BLD AUTO: 0 K/UL — SIGNIFICANT CHANGE UP (ref 0–0.5)
EOSINOPHIL NFR BLD AUTO: 0 % — SIGNIFICANT CHANGE UP (ref 0–6)
FERRITIN SERPL-MCNC: 530 NG/ML — HIGH (ref 30–400)
GLUCOSE SERPL-MCNC: 147 MG/DL — HIGH (ref 70–99)
HCT VFR BLD CALC: 44.5 % — SIGNIFICANT CHANGE UP (ref 39–50)
HGB BLD-MCNC: 13.1 G/DL — SIGNIFICANT CHANGE UP (ref 13–17)
IMM GRANULOCYTES NFR BLD AUTO: 1.3 % — SIGNIFICANT CHANGE UP (ref 0–1.5)
IRON SATN MFR SERPL: 31 % — SIGNIFICANT CHANGE UP (ref 16–55)
IRON SATN MFR SERPL: 81 UG/DL — SIGNIFICANT CHANGE UP (ref 45–165)
LYMPHOCYTES # BLD AUTO: 0.7 K/UL — LOW (ref 1–3.3)
LYMPHOCYTES # BLD AUTO: 5.5 % — LOW (ref 13–44)
MAGNESIUM SERPL-MCNC: 2.3 MG/DL — SIGNIFICANT CHANGE UP (ref 1.6–2.6)
MCHC RBC-ENTMCNC: 26.5 PG — LOW (ref 27–34)
MCHC RBC-ENTMCNC: 29.4 GM/DL — LOW (ref 32–36)
MCV RBC AUTO: 90.1 FL — SIGNIFICANT CHANGE UP (ref 80–100)
MONOCYTES # BLD AUTO: 0.24 K/UL — SIGNIFICANT CHANGE UP (ref 0–0.9)
MONOCYTES NFR BLD AUTO: 1.9 % — LOW (ref 2–14)
NEUTROPHILS # BLD AUTO: 11.52 K/UL — HIGH (ref 1.8–7.4)
NEUTROPHILS NFR BLD AUTO: 91.1 % — HIGH (ref 43–77)
NRBC # BLD: 0 /100 WBCS — SIGNIFICANT CHANGE UP (ref 0–0)
PLATELET # BLD AUTO: 207 K/UL — SIGNIFICANT CHANGE UP (ref 150–400)
POTASSIUM SERPL-MCNC: 4.8 MMOL/L — SIGNIFICANT CHANGE UP (ref 3.5–5.3)
POTASSIUM SERPL-SCNC: 4.8 MMOL/L — SIGNIFICANT CHANGE UP (ref 3.5–5.3)
PROCALCITONIN SERPL-MCNC: <0.05 — SIGNIFICANT CHANGE UP (ref 0–0.04)
PROT SERPL-MCNC: 6.4 G/DL — SIGNIFICANT CHANGE UP (ref 6–8.3)
RBC # BLD: 4.94 M/UL — SIGNIFICANT CHANGE UP (ref 4.2–5.8)
RBC # FLD: 16.7 % — HIGH (ref 10.3–14.5)
SODIUM SERPL-SCNC: 138 MMOL/L — SIGNIFICANT CHANGE UP (ref 135–145)
TIBC SERPL-MCNC: 257 UG/DL — SIGNIFICANT CHANGE UP (ref 220–430)
TSH SERPL-MCNC: 0.69 UIU/ML — SIGNIFICANT CHANGE UP (ref 0.36–3.74)
UIBC SERPL-MCNC: 177 UG/DL — SIGNIFICANT CHANGE UP (ref 110–370)
WBC # BLD: 12.64 K/UL — HIGH (ref 3.8–10.5)
WBC # FLD AUTO: 12.64 K/UL — HIGH (ref 3.8–10.5)

## 2022-01-03 PROCEDURE — 71250 CT THORAX DX C-: CPT | Mod: 26

## 2022-01-03 PROCEDURE — 99232 SBSQ HOSP IP/OBS MODERATE 35: CPT | Mod: GC

## 2022-01-03 RX ORDER — AMLODIPINE BESYLATE 2.5 MG/1
1 TABLET ORAL
Qty: 0 | Refills: 0 | DISCHARGE

## 2022-01-03 RX ORDER — ALBUTEROL 90 UG/1
2 AEROSOL, METERED ORAL
Qty: 0 | Refills: 0 | DISCHARGE

## 2022-01-03 RX ORDER — APIXABAN 2.5 MG/1
5 TABLET, FILM COATED ORAL
Refills: 0 | Status: DISCONTINUED | OUTPATIENT
Start: 2022-01-03 | End: 2022-01-03

## 2022-01-03 RX ORDER — LATANOPROST 0.05 MG/ML
1 SOLUTION/ DROPS OPHTHALMIC; TOPICAL
Qty: 0 | Refills: 0 | DISCHARGE

## 2022-01-03 RX ORDER — APIXABAN 2.5 MG/1
1 TABLET, FILM COATED ORAL
Qty: 0 | Refills: 0 | DISCHARGE

## 2022-01-03 RX ORDER — CARVEDILOL PHOSPHATE 80 MG/1
1 CAPSULE, EXTENDED RELEASE ORAL
Qty: 0 | Refills: 0 | DISCHARGE

## 2022-01-03 RX ORDER — APIXABAN 2.5 MG/1
5 TABLET, FILM COATED ORAL EVERY 12 HOURS
Refills: 0 | Status: DISCONTINUED | OUTPATIENT
Start: 2022-01-03 | End: 2022-01-04

## 2022-01-03 RX ORDER — ARFORMOTEROL TARTRATE 15 UG/2ML
2 SOLUTION RESPIRATORY (INHALATION)
Qty: 0 | Refills: 0 | DISCHARGE

## 2022-01-03 RX ORDER — TAMSULOSIN HYDROCHLORIDE 0.4 MG/1
1 CAPSULE ORAL
Qty: 0 | Refills: 0 | DISCHARGE

## 2022-01-03 RX ORDER — APIXABAN 2.5 MG/1
5 TABLET, FILM COATED ORAL ONCE
Refills: 0 | Status: COMPLETED | OUTPATIENT
Start: 2022-01-03 | End: 2022-01-03

## 2022-01-03 RX ORDER — DONEPEZIL HYDROCHLORIDE 10 MG/1
1 TABLET, FILM COATED ORAL
Qty: 0 | Refills: 0 | DISCHARGE

## 2022-01-03 RX ORDER — TRAMADOL HYDROCHLORIDE 50 MG/1
1 TABLET ORAL
Qty: 0 | Refills: 0 | DISCHARGE

## 2022-01-03 RX ADMIN — APIXABAN 5 MILLIGRAM(S): 2.5 TABLET, FILM COATED ORAL at 12:26

## 2022-01-03 RX ADMIN — BUDESONIDE AND FORMOTEROL FUMARATE DIHYDRATE 2 PUFF(S): 160; 4.5 AEROSOL RESPIRATORY (INHALATION) at 18:44

## 2022-01-03 RX ADMIN — APIXABAN 5 MILLIGRAM(S): 2.5 TABLET, FILM COATED ORAL at 21:54

## 2022-01-03 RX ADMIN — Medication 200 MILLIGRAM(S): at 12:27

## 2022-01-03 RX ADMIN — Medication 1 TABLET(S): at 12:26

## 2022-01-03 RX ADMIN — CARVEDILOL PHOSPHATE 6.25 MILLIGRAM(S): 80 CAPSULE, EXTENDED RELEASE ORAL at 18:43

## 2022-01-03 RX ADMIN — CARVEDILOL PHOSPHATE 6.25 MILLIGRAM(S): 80 CAPSULE, EXTENDED RELEASE ORAL at 05:44

## 2022-01-03 RX ADMIN — DONEPEZIL HYDROCHLORIDE 5 MILLIGRAM(S): 10 TABLET, FILM COATED ORAL at 21:54

## 2022-01-03 RX ADMIN — TRAMADOL HYDROCHLORIDE 50 MILLIGRAM(S): 50 TABLET ORAL at 00:56

## 2022-01-03 RX ADMIN — Medication 200 MILLIGRAM(S): at 18:43

## 2022-01-03 RX ADMIN — TAMSULOSIN HYDROCHLORIDE 0.4 MILLIGRAM(S): 0.4 CAPSULE ORAL at 21:54

## 2022-01-03 RX ADMIN — BUDESONIDE AND FORMOTEROL FUMARATE DIHYDRATE 2 PUFF(S): 160; 4.5 AEROSOL RESPIRATORY (INHALATION) at 05:44

## 2022-01-03 RX ADMIN — TIOTROPIUM BROMIDE 1 CAPSULE(S): 18 CAPSULE ORAL; RESPIRATORY (INHALATION) at 05:45

## 2022-01-03 RX ADMIN — LATANOPROST 1 DROP(S): 0.05 SOLUTION/ DROPS OPHTHALMIC; TOPICAL at 23:52

## 2022-01-03 RX ADMIN — PIPERACILLIN AND TAZOBACTAM 25 GRAM(S): 4; .5 INJECTION, POWDER, LYOPHILIZED, FOR SOLUTION INTRAVENOUS at 03:15

## 2022-01-03 RX ADMIN — Medication 20 MILLIGRAM(S): at 05:44

## 2022-01-03 RX ADMIN — AMLODIPINE BESYLATE 5 MILLIGRAM(S): 2.5 TABLET ORAL at 05:44

## 2022-01-03 NOTE — PATIENT PROFILE ADULT - NS PRO AD ANY ON CHART
[FreeTextEntry1] : short stature\par endocrine consult although may be a matter of time but did decline in percentiles\par discussed at length various options but will reeval height here in 3 months time\par no other developmental concerns
No

## 2022-01-03 NOTE — PHARMACOTHERAPY INTERVENTION NOTE - COMMENTS
Patient is being treated for afib with Eliquis 5 mg BID.  Discussed with Dr. Morgan and recommended re-timing dose for STAT and then q12h because due to routine timing patient would not receive dose until 18:00 today.  MD aware and order re-timed. Patient has a history of afib and currently has an active order for Eliquis 5 mg BID.  Discussed with Dr. Morgan and recommended re-timing dose for STAT and then q12h because due to routine timing patient would not receive dose until 18:00 today.  MD aware and order re-timed.

## 2022-01-03 NOTE — SWALLOW BEDSIDE ASSESSMENT ADULT - COMMENTS
Consult received and chart reviewed. The patient was seen at bedside this AM for an initial assessment of swallow function, at which time he was awake and cooperative. The patient is denying any swallowing difficulties at this time. He was able to follow simple directives and engaged in low-level conversational discourse with this clinician. Patient further denied pain pre/post today's evaluation.    Per charting, the patient is an "87 y/o M w/ PMHx COPD(on O2 3-4L), CHF, Afib(on Eliquis), PPM/defibrillator, BIBEMS for shortness of breath around 12PM today. History as per sonCrow as pt is a poor historian and is confused. Pt was satting around 95 O2, but was "huffing and puffing" prior to EMS arriving today. Admitted for Covid-19 infection with superimposed PNA, ?aspiration."    WBC is elevated. CT of the chest revealed, "cystic mass or loculated fluid collection in the right lower lobe. 1.7 cm nodule in the posterior segment right upper lobe. Please f/u final report for recommendations."    Discussed results and recommendations from this evaluation with the patient, RN, and call out to MD.

## 2022-01-03 NOTE — PROGRESS NOTE ADULT - PROBLEM SELECTOR PLAN 7
DVT PPX: Eliquis 5mg BID  GOC: Discussed with daughter, Imelda, who states that she would like patient to be FULL Code with trials of intubation if needed

## 2022-01-03 NOTE — SWALLOW BEDSIDE ASSESSMENT ADULT - SWALLOW EVAL: DIAGNOSIS
1. The patient demonstrated functional oral management of pureed, mildly thick, and thin liquid textures marked by adequate oral acceptance with adequate bolus collection, transfer, and posterior transport. 2. The patient demonstrated a mild oral dysphagia for minced & moist marked by adequate oral acceptance with delayed bolus collection, transfer, and posterior transport. 3. The patient demonstrated a mild-moderate oral dysphagia for soft & bite-sized marked by prolonged oral manipulation and increased mastication time likely 2/2 absent lower dentition resulting in delayed bolus collection, transfer, and posterior transport. Mild lingual residue noted subsequent to deglutition which reduced with a liquid wash. 4. The patient demonstrated a mild pharyngeal dysphagia for pureed, minced & moist, soft & bite-sized, and mildly thick liquids marked by a suspected delayed pharyngeal swallow trigger with hyolaryngeal elevation noted upon digital palpation w/o evidence of airway penetration.

## 2022-01-03 NOTE — SWALLOW BEDSIDE ASSESSMENT ADULT - ASR SWALLOW RECOMMEND DIAG
Objective testing not warranted as the patient exhibited overt s/s suggestive of airway penetration on thin liquids.

## 2022-01-03 NOTE — PROGRESS NOTE ADULT - PROBLEM SELECTOR PLAN 3
Chronic, on O2 3-4L at home  - per son, pt with chronic cough and sputum production  - ABG on admission w/ no signs of acidosis or CO2 retention  - per son, pt has been on Prednisone Taper since last hospitalization, but takes prednisone 10mg daily  - continue taper as prescribed   - continue home inhalers

## 2022-01-03 NOTE — PROGRESS NOTE ADULT - PROBLEM SELECTOR PLAN 4
Chronic, stable  - proBNP slightly elevated on admission, though pt does not appear to be volume OL  - Takes Torsemide 20mg daily, continue with hold parameters  - close monitoring of volume status

## 2022-01-03 NOTE — PATIENT PROFILE ADULT - FALL HARM RISK - HARM RISK INTERVENTIONS

## 2022-01-03 NOTE — SWALLOW BEDSIDE ASSESSMENT ADULT - SPECIFY REASON(S)
Jose Ang,  See message below. Can you please call patient to inquire if she would like to pursue low contract CT scan for lung cancer screening? If so I can order this for her. Last done in July 2019 which showed stable tiny pulmonary nodules. Thank you   To assess swallow function

## 2022-01-03 NOTE — SWALLOW BEDSIDE ASSESSMENT ADULT - ORAL PHASE
Decreased anterior-posterior movement of the bolus/Delayed oral transit time/Lingual stasis Within functional limits Decreased anterior-posterior movement of the bolus/Delayed oral transit time

## 2022-01-03 NOTE — PROGRESS NOTE ADULT - PROBLEM SELECTOR PLAN 2
pt currently unvaccinated, found to have positive Covid-19 infection  - unclear when symptoms had started as son states pt has hx of chronic cough and had tested negative multiple times during last hospitalization prior to 12/25/21  - as pt is at baseline O2 needs, will hold on dexamethasone use for now

## 2022-01-03 NOTE — SWALLOW BEDSIDE ASSESSMENT ADULT - SWALLOW EVAL: PROGNOSIS
DIAGNOSIS CONT: 5. The patient demonstrated a moderate-severe pharyngeal dysphagia for thin liquids marked by a suspected delayed pharyngeal swallow trigger with hyolaryngeal elevation noted upon digital palpation resulting in multiple swallows suggestive of pharyngeal stasis and/or airway penetration, change in vocal quality to a 'wet' tone, and increased WOB all suggestive of airway penetration.                                                                                                                                                                                                                                                                                                                                                                                         PROGNOSIS: Good for recommended diet

## 2022-01-03 NOTE — PROGRESS NOTE ADULT - PROBLEM SELECTOR PLAN 1
pt with reported chronic cough, O2 desaturation, leukocytosis, and ?RLL opacity vs effusion, concern for superimposed PNA and covid-19 infection  continue on zosyn  ct reviewed 1.7 cm cystic mass, discussed with son, known and follows up with pulmonary had mri, and exxtensive workup   - check procal, strep pneumo, urine legionella  - blood and urine cx ordered, f/u results  - primary team to obtain [facility] records to assess if infiltrate is in fact new   - continue home prednisone taper at this time.

## 2022-01-04 ENCOUNTER — TRANSCRIPTION ENCOUNTER (OUTPATIENT)
Age: 87
End: 2022-01-04

## 2022-01-04 VITALS
OXYGEN SATURATION: 92 % | HEART RATE: 61 BPM | RESPIRATION RATE: 20 BRPM | SYSTOLIC BLOOD PRESSURE: 157 MMHG | DIASTOLIC BLOOD PRESSURE: 79 MMHG | TEMPERATURE: 98 F

## 2022-01-04 DIAGNOSIS — J18.9 PNEUMONIA, UNSPECIFIED ORGANISM: ICD-10-CM

## 2022-01-04 LAB
-  AMIKACIN: SIGNIFICANT CHANGE UP
-  AMOXICILLIN/CLAVULANIC ACID: SIGNIFICANT CHANGE UP
-  AMPICILLIN/SULBACTAM: SIGNIFICANT CHANGE UP
-  AMPICILLIN: SIGNIFICANT CHANGE UP
-  AZTREONAM: SIGNIFICANT CHANGE UP
-  CEFAZOLIN: SIGNIFICANT CHANGE UP
-  CEFEPIME: SIGNIFICANT CHANGE UP
-  CEFOXITIN: SIGNIFICANT CHANGE UP
-  CEFTRIAXONE: SIGNIFICANT CHANGE UP
-  CIPROFLOXACIN: SIGNIFICANT CHANGE UP
-  ERTAPENEM: SIGNIFICANT CHANGE UP
-  GENTAMICIN: SIGNIFICANT CHANGE UP
-  IMIPENEM: SIGNIFICANT CHANGE UP
-  LEVOFLOXACIN: SIGNIFICANT CHANGE UP
-  MEROPENEM: SIGNIFICANT CHANGE UP
-  NITROFURANTOIN: SIGNIFICANT CHANGE UP
-  PIPERACILLIN/TAZOBACTAM: SIGNIFICANT CHANGE UP
-  TIGECYCLINE: SIGNIFICANT CHANGE UP
-  TOBRAMYCIN: SIGNIFICANT CHANGE UP
-  TRIMETHOPRIM/SULFAMETHOXAZOLE: SIGNIFICANT CHANGE UP
ALBUMIN SERPL ELPH-MCNC: 2.6 G/DL — LOW (ref 3.3–5)
ALP SERPL-CCNC: 61 U/L — SIGNIFICANT CHANGE UP (ref 40–120)
ALT FLD-CCNC: 36 U/L — SIGNIFICANT CHANGE UP (ref 12–78)
ANION GAP SERPL CALC-SCNC: 7 MMOL/L — SIGNIFICANT CHANGE UP (ref 5–17)
AST SERPL-CCNC: 15 U/L — SIGNIFICANT CHANGE UP (ref 15–37)
BILIRUB SERPL-MCNC: 0.5 MG/DL — SIGNIFICANT CHANGE UP (ref 0.2–1.2)
BUN SERPL-MCNC: 33 MG/DL — HIGH (ref 7–23)
CALCIUM SERPL-MCNC: 9 MG/DL — SIGNIFICANT CHANGE UP (ref 8.5–10.1)
CHLORIDE SERPL-SCNC: 99 MMOL/L — SIGNIFICANT CHANGE UP (ref 96–108)
CO2 SERPL-SCNC: 35 MMOL/L — HIGH (ref 22–31)
CREAT SERPL-MCNC: 1.1 MG/DL — SIGNIFICANT CHANGE UP (ref 0.5–1.3)
CULTURE RESULTS: SIGNIFICANT CHANGE UP
GLUCOSE SERPL-MCNC: 78 MG/DL — SIGNIFICANT CHANGE UP (ref 70–99)
HCT VFR BLD CALC: 45.2 % — SIGNIFICANT CHANGE UP (ref 39–50)
HGB BLD-MCNC: 13.9 G/DL — SIGNIFICANT CHANGE UP (ref 13–17)
MCHC RBC-ENTMCNC: 27.5 PG — SIGNIFICANT CHANGE UP (ref 27–34)
MCHC RBC-ENTMCNC: 30.8 GM/DL — LOW (ref 32–36)
MCV RBC AUTO: 89.3 FL — SIGNIFICANT CHANGE UP (ref 80–100)
METHOD TYPE: SIGNIFICANT CHANGE UP
NRBC # BLD: 0 /100 WBCS — SIGNIFICANT CHANGE UP (ref 0–0)
ORGANISM # SPEC MICROSCOPIC CNT: SIGNIFICANT CHANGE UP
ORGANISM # SPEC MICROSCOPIC CNT: SIGNIFICANT CHANGE UP
PLATELET # BLD AUTO: 193 K/UL — SIGNIFICANT CHANGE UP (ref 150–400)
POTASSIUM SERPL-MCNC: 4 MMOL/L — SIGNIFICANT CHANGE UP (ref 3.5–5.3)
POTASSIUM SERPL-SCNC: 4 MMOL/L — SIGNIFICANT CHANGE UP (ref 3.5–5.3)
PROT SERPL-MCNC: 5.9 G/DL — LOW (ref 6–8.3)
RBC # BLD: 5.06 M/UL — SIGNIFICANT CHANGE UP (ref 4.2–5.8)
RBC # FLD: 16.9 % — HIGH (ref 10.3–14.5)
SODIUM SERPL-SCNC: 141 MMOL/L — SIGNIFICANT CHANGE UP (ref 135–145)
SPECIMEN SOURCE: SIGNIFICANT CHANGE UP
WBC # BLD: 12.15 K/UL — HIGH (ref 3.8–10.5)
WBC # FLD AUTO: 12.15 K/UL — HIGH (ref 3.8–10.5)

## 2022-01-04 PROCEDURE — 85025 COMPLETE CBC W/AUTO DIFF WBC: CPT

## 2022-01-04 PROCEDURE — 83880 ASSAY OF NATRIURETIC PEPTIDE: CPT

## 2022-01-04 PROCEDURE — 84484 ASSAY OF TROPONIN QUANT: CPT

## 2022-01-04 PROCEDURE — 83550 IRON BINDING TEST: CPT

## 2022-01-04 PROCEDURE — 87086 URINE CULTURE/COLONY COUNT: CPT

## 2022-01-04 PROCEDURE — 71250 CT THORAX DX C-: CPT

## 2022-01-04 PROCEDURE — 83605 ASSAY OF LACTIC ACID: CPT

## 2022-01-04 PROCEDURE — 87077 CULTURE AEROBIC IDENTIFY: CPT

## 2022-01-04 PROCEDURE — 82803 BLOOD GASES ANY COMBINATION: CPT

## 2022-01-04 PROCEDURE — 85610 PROTHROMBIN TIME: CPT

## 2022-01-04 PROCEDURE — 80053 COMPREHEN METABOLIC PANEL: CPT

## 2022-01-04 PROCEDURE — 92610 EVALUATE SWALLOWING FUNCTION: CPT

## 2022-01-04 PROCEDURE — 97161 PT EVAL LOW COMPLEX 20 MIN: CPT

## 2022-01-04 PROCEDURE — 99232 SBSQ HOSP IP/OBS MODERATE 35: CPT | Mod: GC

## 2022-01-04 PROCEDURE — 82550 ASSAY OF CK (CPK): CPT

## 2022-01-04 PROCEDURE — 36415 COLL VENOUS BLD VENIPUNCTURE: CPT

## 2022-01-04 PROCEDURE — 85379 FIBRIN DEGRADATION QUANT: CPT

## 2022-01-04 PROCEDURE — 86140 C-REACTIVE PROTEIN: CPT

## 2022-01-04 PROCEDURE — 36600 WITHDRAWAL OF ARTERIAL BLOOD: CPT

## 2022-01-04 PROCEDURE — 84443 ASSAY THYROID STIM HORMONE: CPT

## 2022-01-04 PROCEDURE — 94640 AIRWAY INHALATION TREATMENT: CPT

## 2022-01-04 PROCEDURE — 87637 SARSCOV2&INF A&B&RSV AMP PRB: CPT

## 2022-01-04 PROCEDURE — 81001 URINALYSIS AUTO W/SCOPE: CPT

## 2022-01-04 PROCEDURE — 93005 ELECTROCARDIOGRAM TRACING: CPT

## 2022-01-04 PROCEDURE — 99497 ADVNCD CARE PLAN 30 MIN: CPT

## 2022-01-04 PROCEDURE — 84145 PROCALCITONIN (PCT): CPT

## 2022-01-04 PROCEDURE — 83735 ASSAY OF MAGNESIUM: CPT

## 2022-01-04 PROCEDURE — 71045 X-RAY EXAM CHEST 1 VIEW: CPT

## 2022-01-04 PROCEDURE — 85730 THROMBOPLASTIN TIME PARTIAL: CPT

## 2022-01-04 PROCEDURE — 87040 BLOOD CULTURE FOR BACTERIA: CPT

## 2022-01-04 PROCEDURE — 99285 EMERGENCY DEPT VISIT HI MDM: CPT

## 2022-01-04 PROCEDURE — 87186 SC STD MICRODIL/AGAR DIL: CPT

## 2022-01-04 PROCEDURE — 82728 ASSAY OF FERRITIN: CPT

## 2022-01-04 PROCEDURE — 83540 ASSAY OF IRON: CPT

## 2022-01-04 PROCEDURE — 85027 COMPLETE CBC AUTOMATED: CPT

## 2022-01-04 RX ORDER — MULTIVIT-MIN/FERROUS GLUCONATE 9 MG/15 ML
1 LIQUID (ML) ORAL
Qty: 0 | Refills: 0 | DISCHARGE

## 2022-01-04 RX ORDER — TIOTROPIUM BROMIDE 18 UG/1
1 CAPSULE ORAL; RESPIRATORY (INHALATION)
Qty: 30 | Refills: 0
Start: 2022-01-04 | End: 2022-02-02

## 2022-01-04 RX ORDER — CHOLECALCIFEROL (VITAMIN D3) 125 MCG
1 CAPSULE ORAL
Qty: 0 | Refills: 0 | DISCHARGE

## 2022-01-04 RX ADMIN — Medication 20 MILLIGRAM(S): at 05:09

## 2022-01-04 RX ADMIN — Medication 200 MILLIGRAM(S): at 05:13

## 2022-01-04 RX ADMIN — AMLODIPINE BESYLATE 5 MILLIGRAM(S): 2.5 TABLET ORAL at 05:09

## 2022-01-04 RX ADMIN — TIOTROPIUM BROMIDE 1 CAPSULE(S): 18 CAPSULE ORAL; RESPIRATORY (INHALATION) at 06:54

## 2022-01-04 RX ADMIN — BUDESONIDE AND FORMOTEROL FUMARATE DIHYDRATE 2 PUFF(S): 160; 4.5 AEROSOL RESPIRATORY (INHALATION) at 06:55

## 2022-01-04 RX ADMIN — CARVEDILOL PHOSPHATE 6.25 MILLIGRAM(S): 80 CAPSULE, EXTENDED RELEASE ORAL at 05:09

## 2022-01-04 NOTE — DISCHARGE NOTE NURSING/CASE MANAGEMENT/SOCIAL WORK - NSDCCRTYPESERV_GEN_ALL_CORE_FT
The patient's son Kris Helton reported that the patient has a private hired aide for 4 hours a day 5 days a week.

## 2022-01-04 NOTE — DISCHARGE NOTE NURSING/CASE MANAGEMENT/SOCIAL WORK - CASE MANAGER'S NAME
Barbara Pat RN [facility] [phone]/  Main office # [phone]/  [place] # [phone]

## 2022-01-04 NOTE — DISCHARGE NOTE NURSING/CASE MANAGEMENT/SOCIAL WORK - NSSCCONTNUM_GEN_ALL_CORE
Please contact the home care agency at the above phone number if you have not heard from them by 12 noon on the day after your hospital discharge.

## 2022-01-04 NOTE — DISCHARGE NOTE NURSING/CASE MANAGEMENT/SOCIAL WORK - PATIENT PORTAL LINK FT
You can access the FollowMyHealth Patient Portal offered by [facility] by registering at the following website: [email or web address]. By joining [employer]’s FollowMyHealth portal, you will also be able to view your health information using other applications (apps) compatible with our system.

## 2022-01-04 NOTE — DISCHARGE NOTE PROVIDER - HOSPITAL COURSE
89 y/o M w/ PMHx COPD(on O2 3-4L), CHF, Afib(on Eliquis), PPM/defibrillator, BIBEMS for shortness of breath around 12PM today. History as per sonCrow as pt is a poor historian and is confused. Pt was satting around 95 O2, but was "huffing and puffing" prior to EMS arriving today. Admitted for Covid-19 infection with superimposed PNA, ?aspiration.       : 2019 novel coronavirus disease (COVID-19).   bacterial pneumonia ruled out  pt currently unvaccinated, found to have positive Covid-19 infection  - unclear when symptoms had started as son states pt has hx of chronic cough and had tested negative multiple times during last hospitalization prior to 12/25/21  - as pt is at baseline O2 needs    : COPD (chronic obstructive pulmonary disease).    Chronic, on O2 3-4L at home  - per son, pt with chronic cough and sputum production  - ABG on admission w/ no signs of acidosis or CO2 retention  - per son, pt has been on Prednisone Taper since last hospitalization, but takes prednisone 10mg daily  - continue taper as prescribed   - continue home inhalers.    CHF (congestive heart failure).   hronic, stable  - proBNP slightly elevated on admission, though pt does not appear to be volume OL  - Takes Torsemide 20mg daily, continue with hold parameters  - close monitoring of volume status.    : Atrial fibrillation.   Chronic, rate controlled  - EKG v-paced  - Takes Eliquis 5mg bid, but admittedly hasn't been compliant with it as he ran out of refills, will continue here  - Carvedilol 6.25mg bid, continue with hold parameters.    : Anemia.    Hb 12.2 on admission, no prior in chart  - MCV normocytic  - Likely chronic   - no signs of active bleed  - check iron studies.

## 2022-01-04 NOTE — PROGRESS NOTE ADULT - ASSESSMENT
87 y/o M w/ PMHx COPD(on O2 3-4L), CHF, Afib(on Eliquis), PPM/defibrillator, BIBEMS for shortness of breath around 12PM today. History as per son, Crow as pt is a poor historian and is confused. Pt was satting around 95 O2, but was "huffing and puffing" prior to EMS arriving today.    DOUBT Bacterial Pna -   procalcitonin neg  on o2 support  ct chest done - report pending      cvs rx regimen and bp control  on diuresis  I and O  monitor labs - replete lytes  AF - on Eliquis -   serial D dimer -   COPD - o2 support - Proventil PRN - Spiriva - Symbicort -   Covid with Hypoxemia - Decadron PO daily - 6 mg  monitor VS and HD and Sat  assist with needs  ACAP and robitussin PRN  isolation precs  Full Code  
87 y/o M w/ PMHx COPD(on O2 3-4L), CHF, Afib(on Eliquis), PPM/defibrillator, BIBEMS for shortness of breath around 12PM today. History as per sonCrow as pt is a poor historian and is confused. Pt was satting around 95 O2, but was "huffing and puffing" prior to EMS arriving today.    DOUBT Bacterial Pna -  - procalcitonin neg  on o2 support  ct chest done - report Loculated pleural collection right lower hemithorax. - Recommend further clinical correlation for complicated pleural collection/empyema.  Emphysema  1.7 cm nodule posterior segment right upper lobe and 0.7 cm nodule left   posterior costophrenic angle.      cvs rx regimen and bp control  on diuresis  I and O  monitor labs - replete lytes  AF - on Eliquis -   serial D dimer -   COPD - o2 support - Proventil PRN - Spiriva - Symbicort -   Covid with Hypoxemia - Decadron PO daily - 6 mg  monitor VS and HD and Sat  assist with needs  ACAP and robitussin PRN  isolation precs  Full Code  
89 y/o M w/ PMHx COPD(on O2 3-4L), CHF, Afib(on Eliquis), PPM/defibrillator, BIBEMS for shortness of breath around 12PM today. History as per son Crow as pt is a poor historian and is confused. Pt was satting around 95 O2, but was "huffing and puffing" prior to EMS arriving today. Admitted for Covid-19 infection with superimposed PNA, ?aspiration.

## 2022-01-04 NOTE — PROGRESS NOTE ADULT - SUBJECTIVE AND OBJECTIVE BOX
Date/Time Patient Seen:  		  Referring MD:   Data Reviewed	       Patient is a 88y old  Male who presents with a chief complaint of Covid-19 and PNA (03 Jan 2022 10:04)      Subjective/HPI     PAST MEDICAL & SURGICAL HISTORY:  COPD (chronic obstructive pulmonary disease)  on 3-4L O2    CHF (congestive heart failure)    Atrial fibrillation  on eliquis, s/p ppm    H/O knee surgery          Medication list         MEDICATIONS  (STANDING):  amLODIPine   Tablet 5 milliGRAM(s) Oral daily  apixaban 5 milliGRAM(s) Oral every 12 hours  budesonide 160 MICROgram(s)/formoterol 4.5 MICROgram(s) Inhaler 2 Puff(s) Inhalation two times a day  carvedilol 6.25 milliGRAM(s) Oral every 12 hours  donepezil 5 milliGRAM(s) Oral at bedtime  latanoprost 0.005% Ophthalmic Solution 1 Drop(s) Both EYES at bedtime  multivitamin 1 Tablet(s) Oral daily  tamsulosin 0.4 milliGRAM(s) Oral at bedtime  tiotropium 18 MICROgram(s) Capsule 1 Capsule(s) Inhalation daily  torsemide 20 milliGRAM(s) Oral daily    MEDICATIONS  (PRN):  ALBUTerol    90 MICROgram(s) HFA Inhaler 2 Puff(s) Inhalation every 6 hours PRN Shortness of Breath and/or Wheezing  guaiFENesin Oral Liquid (Sugar-Free) 200 milliGRAM(s) Oral every 6 hours PRN Cough  traMADol 50 milliGRAM(s) Oral every 8 hours PRN Severe Pain (7 - 10)         Vitals log        ICU Vital Signs Last 24 Hrs  T(C): 36.5 (04 Jan 2022 04:34), Max: 36.5 (04 Jan 2022 04:34)  T(F): 97.7 (04 Jan 2022 04:34), Max: 97.7 (04 Jan 2022 04:34)  HR: 61 (04 Jan 2022 04:34) (59 - 64)  BP: 157/79 (04 Jan 2022 04:34) (110/52 - 169/88)  BP(mean): --  ABP: --  ABP(mean): --  RR: 20 (04 Jan 2022 04:34) (20 - 22)  SpO2: 92% (04 Jan 2022 04:34) (92% - 97%)           Input and Output:  I&O's Detail    02 Jan 2022 07:01  -  03 Jan 2022 07:00  --------------------------------------------------------  IN:  Total IN: 0 mL    OUT:    Voided (mL): 600 mL  Total OUT: 600 mL    Total NET: -600 mL      03 Jan 2022 07:01  -  04 Jan 2022 05:41  --------------------------------------------------------  IN:    Oral Fluid: 300 mL  Total IN: 300 mL    OUT:    Voided (mL): 750 mL  Total OUT: 750 mL    Total NET: -450 mL          Lab Data                        13.1   12.64 )-----------( 207      ( 03 Jan 2022 08:49 )             44.5     01-03    138  |  101  |  26<H>  ----------------------------<  147<H>  4.8   |  32<H>  |  1.00    Ca    9.1      03 Jan 2022 08:49  Mg     2.3     01-03    TPro  6.4  /  Alb  2.7<L>  /  TBili  0.4  /  DBili  x   /  AST  15  /  ALT  40  /  AlkPhos  76  01-03    ABG - ( 02 Jan 2022 15:10 )  pH, Arterial: 7.38  pH, Blood: x     /  pCO2: 48    /  pO2: 119   / HCO3: 28    / Base Excess: 3.3   /  SaO2: 100.0             CARDIAC MARKERS ( 02 Jan 2022 15:07 )  x     / x     / 16 U/L / x     / x            Review of Systems	      Objective     Physical Examination  heart s1s2  lung dc BS  abd soft  head nc        Pertinent Lab findings & Imaging      Katia:  NO   Adequate UO     I&O's Detail    02 Jan 2022 07:01  -  03 Jan 2022 07:00  --------------------------------------------------------  IN:  Total IN: 0 mL    OUT:    Voided (mL): 600 mL  Total OUT: 600 mL    Total NET: -600 mL      03 Jan 2022 07:01  -  04 Jan 2022 05:41  --------------------------------------------------------  IN:    Oral Fluid: 300 mL  Total IN: 300 mL    OUT:    Voided (mL): 750 mL  Total OUT: 750 mL    Total NET: -450 mL               Discussed with:     Cultures:	        Radiology                            
Date/Time Patient Seen:  		  Referring MD:   Data Reviewed	       Patient is a 88y old  Male who presents with a chief complaint of Covid-19 and PNA (02 Jan 2022 21:44)      Subjective/HPI     PAST MEDICAL & SURGICAL HISTORY:  COPD (chronic obstructive pulmonary disease)  on 3-4L O2    CHF (congestive heart failure)    Atrial fibrillation  on eliquis, s/p ppm    H/O knee surgery          Medication list         MEDICATIONS  (STANDING):  amLODIPine   Tablet 5 milliGRAM(s) Oral daily  budesonide 160 MICROgram(s)/formoterol 4.5 MICROgram(s) Inhaler 2 Puff(s) Inhalation two times a day  carvedilol 6.25 milliGRAM(s) Oral every 12 hours  donepezil 5 milliGRAM(s) Oral at bedtime  latanoprost 0.005% Ophthalmic Solution 1 Drop(s) Both EYES at bedtime  multivitamin 1 Tablet(s) Oral daily  piperacillin/tazobactam IVPB.. 3.375 Gram(s) IV Intermittent every 8 hours  tamsulosin 0.4 milliGRAM(s) Oral at bedtime  tiotropium 18 MICROgram(s) Capsule 1 Capsule(s) Inhalation daily  torsemide 20 milliGRAM(s) Oral daily    MEDICATIONS  (PRN):  ALBUTerol    90 MICROgram(s) HFA Inhaler 2 Puff(s) Inhalation every 6 hours PRN Shortness of Breath and/or Wheezing  guaiFENesin Oral Liquid (Sugar-Free) 200 milliGRAM(s) Oral every 6 hours PRN Cough  traMADol 50 milliGRAM(s) Oral every 8 hours PRN Severe Pain (7 - 10)         Vitals log        ICU Vital Signs Last 24 Hrs  T(C): 36.4 (03 Jan 2022 05:03), Max: 36.5 (02 Jan 2022 19:54)  T(F): 97.5 (03 Jan 2022 05:03), Max: 97.7 (02 Jan 2022 19:54)  HR: 61 (03 Jan 2022 05:03) (60 - 70)  BP: 163/72 (03 Jan 2022 05:03) (120/69 - 175/79)  BP(mean): --  ABP: --  ABP(mean): --  RR: 20 (03 Jan 2022 05:03) (20 - 22)  SpO2: 96% (03 Jan 2022 05:03) (96% - 100%)           Input and Output:  I&O's Detail    02 Jan 2022 07:01  -  03 Jan 2022 05:28  --------------------------------------------------------  IN:  Total IN: 0 mL    OUT:    Voided (mL): 600 mL  Total OUT: 600 mL    Total NET: -600 mL          Lab Data                        12.2   13.53 )-----------( 190      ( 02 Jan 2022 15:07 )             39.0     01-02    141  |  108  |  29<H>  ----------------------------<  206<H>  4.7   |  27  |  0.85    Ca    9.3      02 Jan 2022 15:07    TPro  5.7<L>  /  Alb  2.4<L>  /  TBili  0.3  /  DBili  x   /  AST  20  /  ALT  41  /  AlkPhos  88  01-02    ABG - ( 02 Jan 2022 15:10 )  pH, Arterial: 7.38  pH, Blood: x     /  pCO2: 48    /  pO2: 119   / HCO3: 28    / Base Excess: 3.3   /  SaO2: 100.0             CARDIAC MARKERS ( 02 Jan 2022 15:07 )  x     / x     / 16 U/L / x     / x            Review of Systems	      Objective     Physical Examination  heart s1s2  lung dec BS  abd soft  head nc  on o2 support        Pertinent Lab findings & Imaging      Katia:  NO   Adequate UO     I&O's Detail    02 Jan 2022 07:01  -  03 Jan 2022 05:28  --------------------------------------------------------  IN:  Total IN: 0 mL    OUT:    Voided (mL): 600 mL  Total OUT: 600 mL    Total NET: -600 mL               Discussed with:     Cultures:	        Radiology                            
Patient is a 88y old  Male who presents with a chief complaint of Covid-19 and PNA (2022 05:28)        HPI:  87 y/o M w/ PMHx COPD(on O2 3-4L), CHF, Afib(on Eliquis), PPM/defibrillator, BIBEMS for shortness of breath around 12PM today. History as per son, Crow dorsey pt is a poor historian and is confused. Pt was sitting around watching tv, when he started "huffing and puffing", but was satting around 95 O2. States pt is a long-time smoker and is always congested and is always coughing with sputum production. Denies any sick contacts or recent travel. Denies receiving any covid vaccinations. Son states his confusion started when he left [facility], which he describes as "not making any sense" or "talking gibberish". At baseline, pt uses a cane to ambulate and since ~1 month ago, pt has been unable to perform most ADLs, (i.e. needs assistance with bathing and dressing). Also has a aide at home that comes ~4hrs/day.     Of note, pt with recent hospitalization at [facility] from - for PNA, was given abx and prednisone, and had been dc'd on prednisone taper.     ED Course: s/p IV zosyn x 1, 1L ns bolus x 1, albuterol x1, Solu-medrol 125mg IVP x 1, Zofran 4mg IVP x 1  Vitals: T 97.6, HR 70, /69, O2 96 on NRB  Labs: wbc 13.53, Hb 12.2, BUN 29, Glucose 206, Tprotein 5.7, Alb 2.4, CK 16, ProBNP 971, pO2 119, SARS-COV-2 positive  UA: Slightly turbid, Moderate LE, wbc 11-25, rbc 3-5, Moderate bacteria, Moderate epithelial cells   CXR(wet read): ?RLL effusion vs opacity, f/u official report  EKG: Ventricular paced rhythm, QT/QTc 440/450 (2022 19:16)      SUBJECTIVE & OBJECTIVE: Pt seen and examined at bedside. nad    PHYSICAL EXAM:  T(C): 36.4 (22 @ 05:03), Max: 36.5 (22 @ 19:54)  HR: 61 (22 @ 05:03) (60 - 70)  BP: 163/72 (22 @ 05:03) (120/69 - 175/79)  RR: 20 (22 @ 05:03) (20 - 22)  SpO2: 96% (22 @ 05:03) (96% - 100%)  Wt(kg): -- Height (cm): 157.5 ( @ 15:14)  Weight (kg): 72.6 ( @ 15:14)  BMI (kg/m2): 29.3 ( @ 15:14)  BSA (m2): 1.74 ( @ 15:14)  GENERAL: NAD, well-groomed, well-developed  NERVOUS SYSTEM:  Alert & Oriented X3,  CHEST/LUNG: decrease aire ntry at paige bases   HEART: Regular rate and rhythm; No murmurs, rubs, or gallops  ABDOMEN: Soft, Nontender, Nondistended; Bowel sounds present  EXTREMITIES:  2+ Peripheral Pulses, No clubbing, cyanosis, or edema        MEDICATIONS  (STANDING):  amLODIPine   Tablet 5 milliGRAM(s) Oral daily  apixaban 5 milliGRAM(s) Oral two times a day  budesonide 160 MICROgram(s)/formoterol 4.5 MICROgram(s) Inhaler 2 Puff(s) Inhalation two times a day  carvedilol 6.25 milliGRAM(s) Oral every 12 hours  donepezil 5 milliGRAM(s) Oral at bedtime  latanoprost 0.005% Ophthalmic Solution 1 Drop(s) Both EYES at bedtime  multivitamin 1 Tablet(s) Oral daily  piperacillin/tazobactam IVPB.. 3.375 Gram(s) IV Intermittent every 8 hours  tamsulosin 0.4 milliGRAM(s) Oral at bedtime  tiotropium 18 MICROgram(s) Capsule 1 Capsule(s) Inhalation daily  torsemide 20 milliGRAM(s) Oral daily    MEDICATIONS  (PRN):  ALBUTerol    90 MICROgram(s) HFA Inhaler 2 Puff(s) Inhalation every 6 hours PRN Shortness of Breath and/or Wheezing  guaiFENesin Oral Liquid (Sugar-Free) 200 milliGRAM(s) Oral every 6 hours PRN Cough  traMADol 50 milliGRAM(s) Oral every 8 hours PRN Severe Pain (7 - 10)      LABS:                        13.1   12.64 )-----------( 207      ( 2022 08:49 )             44.5         138  |  101  |  26<H>  ----------------------------<  147<H>  4.8   |  32<H>  |  1.00    Ca    9.1      2022 08:49  Mg     2.3     -    TPro  6.4  /  Alb  2.7<L>  /  TBili  0.4  /  DBili  x   /  AST  15  /  ALT  40  /  AlkPhos  76  -03    PT/INR - ( 2022 15:07 )   PT: 11.0 sec;   INR: 0.94 ratio         PTT - ( 2022 15:07 )  PTT:26.9 sec  Urinalysis Basic - ( 2022 16:42 )    Color: Yellow / Appearance: Slightly Turbid / S.020 / pH: x  Gluc: x / Ketone: Negative  / Bili: Negative / Urobili: Negative   Blood: x / Protein: 30 mg/dL / Nitrite: Negative   Leuk Esterase: Moderate / RBC: 3-5 /HPF / WBC 11-25   Sq Epi: x / Non Sq Epi: Moderate / Bacteria: Moderate      Magnesium, Serum: 2.3 mg/dL ( @ 08:49)    CAPILLARY BLOOD GLUCOSE          CAPILLARY BLOOD GLUCOSE        CAPILLARY BLOOD GLUCOSE        ABG - ( 2022 15:10 )  pH, Arterial: 7.38  pH, Blood: x     /  pCO2: 48    /  pO2: 119   / HCO3: 28    / Base Excess: 3.3   /  SaO2: 100.0             CARDIAC MARKERS ( 2022 15:07 )  x     / x     / 16 U/L / x     / x            RECENT CULTURES:      RADIOLOGY & ADDITIONAL TESTS:                        DVT/GI ppx  Discussed with pt @ bedside

## 2022-01-04 NOTE — DISCHARGE NOTE PROVIDER - CARE PROVIDER_API CALL
Cam Akers)  Internal Medicine  [address] [place] [address]  Phone: [phone]  Fax: [phone]  Follow Up Time:

## 2022-01-04 NOTE — DISCHARGE NOTE PROVIDER - NSDCMRMEDTOKEN_GEN_ALL_CORE_FT
Albuterol (Eqv-Proventil HFA) 90 mcg/inh inhalation aerosol: 2 puff(s) inhaled every 6 hours  amLODIPine 5 mg oral tablet: 1 tab(s) orally once a day  Brovana 15 mcg/2 mL inhalation solution: 2 milliliter(s) inhaled 2 times a day  carvedilol 6.25 mg oral tablet: 1 tab(s) orally 2 times a day  donepezil 5 mg oral tablet: 1 tab(s) orally once a day (at bedtime)  Eliquis 5 mg oral tablet: 1 tab(s) orally 2 times a day  latanoprost 0.005% ophthalmic solution: 1 drop(s) to each affected eye once a day (in the evening)  multivitamin: 1 tab(s) orally once a day  predniSONE 10 mg oral tablet: 3 tab(s) orally daily until Monday, then 2 tabs through Saturday, then 1 tablet daily every day  tamsulosin 0.4 mg oral capsule: 1 cap(s) orally once a day  torsemide 20 mg oral tablet: 1 tab(s) orally once a day  traMADol 50 mg oral tablet: 1 tab(s) orally every 6 hours, As Needed for back pain

## 2022-01-04 NOTE — DISCHARGE NOTE PROVIDER - NSDCCPCAREPLAN_GEN_ALL_CORE_FT
PRINCIPAL DISCHARGE DIAGNOSIS  Diagnosis: COPD exacerbation  Assessment and Plan of Treatment: resolved  continue taper preisone      SECONDARY DISCHARGE DIAGNOSES  Diagnosis: 2019 novel coronavirus disease (COVID-19)  Assessment and Plan of Treatment:     Diagnosis: Pneumonia of right lung due to infectious organism, unspecified part of lung  Assessment and Plan of Treatment:

## 2022-01-04 NOTE — DISCHARGE NOTE NURSING/CASE MANAGEMENT/SOCIAL WORK - NSDCPEFALRISK_GEN_ALL_CORE
For information on Fall & Injury Prevention, visit: [email or web address] OR  [email or web address] OR  https://www.cdc.gov/steadi/patient.html

## 2022-01-04 NOTE — PHYSICAL THERAPY INITIAL EVALUATION ADULT - ADDITIONAL COMMENTS
Pt lives w/ his spouse in a house, no steps. Pt ambulates independently with SC- has RW to use as needed. Pt stated him and his spouse assist each other with ADLs. Pt has aide 5xwk for 4 hours. Pt uses 4L home O2 24/7

## 2022-01-04 NOTE — DISCHARGE NOTE NURSING/CASE MANAGEMENT/SOCIAL WORK - NSDCDMETYPESERV_GEN_ALL_CORE_FT
The patient's son Crow Helton reported that the patient has Home Oxygen (concentrator and portables,  a cane, a rolling walker, a shower chair and grab bars in the shower)

## 2022-01-04 NOTE — GOALS OF CARE CONVERSATION - ADVANCED CARE PLANNING - CONVERSATION DETAILS
Writer spoke with son/surrogate Crow. Reviewed patient's medical and social history as well as events leading to patient's hospitalization. Writer discussed patient's current diagnosis (COPD,CHF,A/F,PNA,AMS), medical condition and management, . prognosis, and life expectancy. Inquired about patient's wishes regarding extent of medical care to be provided including escalation of medical care into the ICU and use of vasopressor support. In addition, the writer inquired about thoughts regarding cardiopulmnary resuscitation, artificial nutrition and hydration including use of feeding tubes and IVF, antibiotics, and further investigative studies such as blood draws and radiology. Crow  showed good insight into medical condition. Crow states his father has a quality of life document that states he wants all resuscitation. Crow states he and his sister know when to stop life extending treatment. Psychosocial support provided.

## 2022-01-04 NOTE — ED ADULT NURSE NOTE - PAIN RATING/NUMBER SCALE (0-10): REST
[de-identified] : Left tibia radiographs were obtained and independently reviewed in clinic on 01/04/2022 which are remarkable for   tibia spiral fracture in anatomic alignment. good interval healing  No other osseous findings.\par 
0

## 2022-01-05 LAB — CRP SERPL-MCNC: 6 MG/L — HIGH

## 2022-01-07 LAB
CULTURE RESULTS: SIGNIFICANT CHANGE UP
CULTURE RESULTS: SIGNIFICANT CHANGE UP
SPECIMEN SOURCE: SIGNIFICANT CHANGE UP
SPECIMEN SOURCE: SIGNIFICANT CHANGE UP

## 2023-08-23 NOTE — PATIENT PROFILE ADULT - PACKS PER DAY
Bed/Stretcher in lowest position, wheels locked, appropriate side rails in place/Call bell, personal items and telephone in reach/Instruct patient to call for assistance before getting out of bed/chair/stretcher/Non-slip footwear applied when patient is off stretcher/[place] to call system/Physically safe environment - no spills, clutter or unnecessary equipment/Purposeful proactive rounding/Room/bathroom lighting operational, light cord in reach
0

## 2023-12-05 NOTE — ED ADULT NURSE NOTE - NSFALLRSKASSESSTYPE_ED_ALL_ED
Initial (On Arrival) Daily Dosage (Cgy): 270.48 Additional Change To Daily Dosage Administered Mid Treatment?: No Total Cumulative Dose (Cgy): 4868.64 Treatment Documentation: This patient has been treated today with image-guided superficial radiation therapy for non-melanoma skin cancer. Written informed consent has been previously obtained from this patient for this treatment. This consent is documented in the patient’s chart. The patient gave verbal consent to continue treatment today. The patient was treated with a specific radiation dose and setup as prescribed by the provider listed on this visit note. A Radiation Therapist performed administration of radiation under the supervision of a provider. The treatment parameters and cumulative dose are indicated above. Prior to administering the radiation, the patient underwent a verification therapeutic radiology simulation-aided field setting defining relevant normal and abnormal target anatomy in addition to data necessary to develop an optimal radiation treatment process for the patient. The field placement simulation documents any change seen in overall tumor volume documented in the patient’s record, allows the clinician to indicate any needed changes in the treatment plan and/or prescription, provides diagnostic evaluation as the basis for performing the therapeutic procedure, and clearly identifies the information needed to decide to proceed with the therapeutic procedure. This process includes verification of the treatment port(s) and proper treatment positioning. All treatment ports were photographed within electronic medical records. The patient’s lead blocking along with gross tumor volume and margin was confirmed. Considering superficial radiotherapy is clinical in setup, this requires the physician and radiation therapist to clarify the location interest being treated against initial images, pathology, and patient anatomy. Care was taken to ensure brizuela treated were geometrically accurate and properly positioned using therapeutic radiology simulation-aided field setting verification per\\n \\nfraction. This process is also utilized to determine if any prescription or setup changes are necessary. These steps are therefore medically necessary to ensure safe and effective administration of radiation. Ongoing therapeutic radiology simulation-aided field setting verification is ordered throughout the course of therapy. Per Dr. Stuart, continued therapeutic radiology simulation-aided field setting verification per fraction is required for field placement. Per Dr. Stuart, continued ultrasound guidance will continue on OTV treatment days which is required for, measurement of tumor depth, width, breadth, tumor progress, whether to proceed with therapeutic delivery, and acute effect monitoring. Bill For Treatment ([id number])?: Yes Prescription Used: 1 Calculate Total Cumulative Dose Automatically Or Manually: Manually Ultrasound Used Text: High frequency ultrasound depth is mm, which is mm in difference from previous imaging. Fraction Number: 18 Energy (Kv): 70 Ultrasound Not Used Text: Ultrasound was not performed today due to